# Patient Record
Sex: MALE
[De-identification: names, ages, dates, MRNs, and addresses within clinical notes are randomized per-mention and may not be internally consistent; named-entity substitution may affect disease eponyms.]

---

## 2024-05-31 PROBLEM — Z00.00 ENCOUNTER FOR PREVENTIVE HEALTH EXAMINATION: Status: ACTIVE | Noted: 2024-05-31

## 2024-06-03 ENCOUNTER — APPOINTMENT (OUTPATIENT)
Dept: ORTHOPEDIC SURGERY | Facility: CLINIC | Age: 34
End: 2024-06-03
Payer: COMMERCIAL

## 2024-06-03 DIAGNOSIS — M76.899 OTHER SPECIFIED ENTHESOPATHIES OF UNSPECIFIED LOWER LIMB, EXCLUDING FOOT: ICD-10-CM

## 2024-06-03 DIAGNOSIS — S86.019S: ICD-10-CM

## 2024-06-03 PROCEDURE — 99204 OFFICE O/P NEW MOD 45 MIN: CPT

## 2024-06-03 RX ORDER — DICLOFENAC SODIUM 20 MG/G
2 SOLUTION TOPICAL 3 TIMES DAILY
Qty: 1 | Refills: 0 | Status: ACTIVE | COMMUNITY
Start: 2024-06-03

## 2024-06-03 NOTE — PHYSICAL EXAM
[de-identified] : Gen: NAD Resp: Nonlabored respirations, no SOB Gait: Nl  R Knee: Skin intact No effusion 0-130 AROM Nontender MJL/LJL, superior/inferior pole patella 5/5 IP Q EHL TA GS SILT L3-S1 2+ dp pt wwp bcr TTP biceps femoris insertion with resisted knee flexion  1A lachman and (-) pivot shift Grade 1 posterior drawer Stable to v/v at 0 and 30 degrees (-) Dial test at 30, 90 degrees  (-) Mira, [place]ssaly Stable and pain-free 2 leg squat  1+ patellar translation (-) pain with patellar compression/grind (-) J sign (-) apprehension   Constitutional: Well-nourished, well-developed, No acute distress  Respiratory:  Good respiratory effort, no SOB  Psychiatric: Pleasant and normal affect, alert and oriented x3   Foot: Skin: Clean dry and intact  Ankle: nontender, no proximal fibular pain Tenderness: achilles insertion, full strength ROM: full ankle/knee rom, -10-30 DF-PF, nl eversion/inversion 5/5 EHL TA GS SILT L3-S1 2+ dp bcr

## 2024-06-03 NOTE — DISCUSSION/SUMMARY
[de-identified] : 33yM pw R biceps femoris tendinitis and L achilles tendinitis.  The patient was extensively counseled on treatment options including but not limited to observation, rest/activity modification, bracing, anti-inflammatory medications, physical therapy, injections, and surgery.  The natural history of the disease was thoroughly explained.  We discussed that the majority of the time, this condition can be initially treated conservatively. The patient will proceed with: -PT -diclofenac rx provided - pt instructed to take with meals and not with other nsaid's including advil, aleve, and toradol.  The pt understands to stop taking the medication if any stomach sx develop or they develop any type of bleeding or bruising, at which point they will present to their PMD -pt was instructed on the importance of resting, icing and elevating to minimize swelling -RTC 6w   I have personally obtained the history, reviewed the ROS as noted, and performed the physical examination today.  The patient and I discussed the assessment and options and developed the plan.  All questions were answered and the patient stated their understanding of the treatment plan and appreciation of the visit.   My cumulative time spent on this patient's visit included: Preparation for the visit, review of the medical records, review of pertinent diagnostic studies, examination and counseling of the patient on the above diagnosis, treatment plan and prognosis, orders of diagnostic tests, medications and/or appropriate procedures and documentation in the medical records of today's visit.   Fracisco Huang MD

## 2024-06-03 NOTE — HISTORY OF PRESENT ILLNESS
[de-identified] : Lars is a 34yo male presenting today with R knee and Left Achilles pain. Denies any recent trauma, previous hx of injury. Onset about a month ago. He works out every week, noticed the discomfort and stopped working out. Lateral knee pain described as dull and tight, worse with prolonged standing, walking and bending. Left achilles pain described as sore and tight especially first thing in the morning with first steps then symptoms gradually improve. Previously evaluated by another MD but sough second opinion. Has not been medically treated but is scheduled to begin PT today. Does not use or take anything for the pain.

## 2025-07-20 ENCOUNTER — EMERGENCY (EMERGENCY)
Facility: HOSPITAL | Age: 35
LOS: 1 days | End: 2025-07-20
Attending: STUDENT IN AN ORGANIZED HEALTH CARE EDUCATION/TRAINING PROGRAM | Admitting: STUDENT IN AN ORGANIZED HEALTH CARE EDUCATION/TRAINING PROGRAM
Payer: COMMERCIAL

## 2025-07-20 VITALS
HEART RATE: 69 BPM | WEIGHT: 199.96 LBS | HEIGHT: 73 IN | TEMPERATURE: 98 F | DIASTOLIC BLOOD PRESSURE: 90 MMHG | SYSTOLIC BLOOD PRESSURE: 150 MMHG | OXYGEN SATURATION: 98 % | RESPIRATION RATE: 16 BRPM

## 2025-07-20 PROCEDURE — 73120 X-RAY EXAM OF HAND: CPT | Mod: 26,RT

## 2025-07-20 PROCEDURE — 99284 EMERGENCY DEPT VISIT MOD MDM: CPT | Mod: 25

## 2025-07-20 RX ORDER — ACETAMINOPHEN 500 MG/5ML
975 LIQUID (ML) ORAL ONCE
Refills: 0 | Status: COMPLETED | OUTPATIENT
Start: 2025-07-20 | End: 2025-07-20

## 2025-07-20 RX ORDER — AMOXICILLIN AND CLAVULANATE POTASSIUM 500; 125 MG/1; MG/1
1 TABLET, FILM COATED ORAL ONCE
Refills: 0 | Status: COMPLETED | OUTPATIENT
Start: 2025-07-20 | End: 2025-07-20

## 2025-07-20 RX ORDER — AMOXICILLIN AND CLAVULANATE POTASSIUM 500; 125 MG/1; MG/1
1 TABLET, FILM COATED ORAL
Qty: 14 | Refills: 0
Start: 2025-07-20 | End: 2025-07-26

## 2025-07-20 RX ORDER — IBUPROFEN 200 MG
600 TABLET ORAL ONCE
Refills: 0 | Status: COMPLETED | OUTPATIENT
Start: 2025-07-20 | End: 2025-07-20

## 2025-07-20 RX ADMIN — Medication 600 MILLIGRAM(S): at 01:55

## 2025-07-20 RX ADMIN — Medication 975 MILLIGRAM(S): at 01:55

## 2025-07-20 RX ADMIN — AMOXICILLIN AND CLAVULANATE POTASSIUM 1 TABLET(S): 500; 125 TABLET, FILM COATED ORAL at 01:55

## 2025-07-20 NOTE — ED PROVIDER NOTE - CLINICAL SUMMARY MEDICAL DECISION MAKING FREE TEXT BOX
34y healthy male presenting with dog bite on the R thumb    PDx: dog bite    Plan  X-ray of thumb  Augmentin 500mg po  Tetanus booster  Discharge if x-ray negative and follow up w/ PCP

## 2025-07-20 NOTE — ED ADULT TRIAGE NOTE - CHIEF COMPLAINT QUOTE
Pt c/o dog bite today to right thumb. Able to move extremity, no active bleeding. Denies medical conditions, blood thinner use.

## 2025-07-20 NOTE — ED PROVIDER NOTE - PHYSICAL EXAMINATION
CONSTITUTIONAL: Well appearing, awake, alert, and in no apparent distress.  HEAD: normocephalic, atraumatic  ENT: oral mucosa moist  CARDIAC: regular rate and rhythm; no murmurs, rubs, or gallops  RESPIRATORY: normal breath sounds, clear to ascultation bilaterally, no rales, rhonchi, wheezing  GASTROINTESTINAL: abdomen nondistended, soft, nontender, no guarding, rebound tenderness  MSK: Spine appears normal, range of motion is not limited, no muscle or joint tenderness  Some small punctate wounds on the lateral aspects of the right thumb. No gross swelling, erythema, or ecchymosis. Tenderness with slight palpation of the right thumb. 5/5 strength with wrist flexion and extension bilaterally. Was not able to perform thumb extension on the right due to pain.  NEURO: Alert and oriented, no focal deficits, no motor or sensory deficits.  SKIN: Skin normal color for race, warm, dry and intact. No evidence of rash.  PSYCH: appropriate mood and affect CONSTITUTIONAL: Well appearing, awake, alert, and in no apparent distress.  HEAD: normocephalic, atraumatic  ENT: oral mucosa moist  CARDIAC: regular rate and rhythm; no murmurs, rubs, or gallops  RESPIRATORY: normal breath sounds, clear to ascultation bilaterally, no rales, rhonchi, wheezing  GASTROINTESTINAL: abdomen nondistended, soft, nontender, no guarding, rebound tenderness  MSK: Spine appears normal, range of motion is not limited, no muscle or joint tenderness  Some small punctate wounds on the lateral aspects of the right thumb. No gross swelling, erythema, or ecchymosis. Tenderness with slight palpation of the right thumb. 5/5 strength with wrist flexion and extension bilaterally. Was not able to perform thumb extension on the right due to pain.  NEURO: Alert and oriented, no focal deficits, no motor or sensory deficits.  SKIN: Skin normal color for race, warm, dry and intact. No evidence of rash. Bite findings as above.  PSYCH: appropriate mood and affect

## 2025-07-20 NOTE — ED PROVIDER NOTE - ATTENDING CONTRIBUTION TO CARE
34M no PMH p/w animal bite. Pt was at friend's house when dog "latched on" to his finger approx 7 hours prior to ED assessment. Uncertain of vaccination status of dog. Notes pain and swelling to finger since bite. Cleaned with water, alcohol, and peroxide at home. . Originally was not going to come to ED but was passing by so came in for assessment. On exam has small punctate wounds on the medial and lateral aspects of the right thumb with mild swelling and tenderness. 5/5 strength with wrist flexion and extension bilaterally. Thumb with limited ROM due to pain.   MDM: 34M no PMH p/w animal bite with exam notable for several small punctate wound to lateral and medial aspects of thumb. No other areas affected. Last tetanus 10 or more years ago so will update today. Will give augmentin for ppx. Will get XRay to r/o fracture. Recommended rabies vaccination series given unknown status of the dog but after explaining to patient he had declined the series with informed consent. Return instructions provided. DC team to assist with arranging hand follow-up.

## 2025-07-20 NOTE — ED PROVIDER NOTE - PATIENT PORTAL LINK FT
You can access the FollowMyHealth Patient Portal offered by [facility] by registering at the following website: [email or web address]. By joining [facility] FollowMyHealth portal, you will also be able to view your health information using other applications (apps) compatible with our system.

## 2025-07-20 NOTE — ED PROVIDER NOTE - OBJECTIVE STATEMENT
This is a 34y healthy M self-presenting for a dog bite on right thumb. He says this his third animal bite but 1st on the UE. Around 7 hours ago he was bit by a domesticated "pocket pit bill". The dog did not show signs of rabies, however its vaccination status could not be verified. After he was bit he put his thumb in a cup of alcohol and peroxide for about 30 minutes and decided to come to the ED several hours later. The pain started a few hours ago and has increased in intensity since its onset. The pain is 4/10 without movement and 7/10 with movement. Moving it around makes it feel worse. He did not take any pain medications. He denies fever, chills, shortness of breath, numbness/tingling, and abdominal pain.

## 2025-07-20 NOTE — ED PROVIDER NOTE - PROGRESS NOTE DETAILS
Pt uncertain of dog's vaccination status. Discussed rabies vaccination series with patient but he has declined administration. Explained risks of potential exposure given unknown vaccination status of dog and risk of death if contracted rabies but pt still declined with full understanding of risks.

## 2025-07-20 NOTE — ED ADULT NURSE NOTE - OBJECTIVE STATEMENT
Pt is A&Ox4 and ambulatory at baseline. Pt was bit by a dog to the right thumb earlier today. Pt states the owners of the dog just got it and is unsure of the vaccination history. Puncture wound to right thumb. Not actively bleeding. Pt medicated as ordered. Denies fever, chills, CP, SOB, N/V/D, blood thinner use. Stretcher is in the lowest position and safety maintained.

## 2025-07-20 NOTE — ED PROVIDER NOTE - NSFOLLOWUPINSTRUCTIONS_ED_ALL_ED_FT
You were seen in the ED today for a dog bite. You received a tetanus vaccine but opted out of rabies vaccination series. Our discharge team will assist with outpatient follow-up with a Hand specialist to monitor your progress      Please complete full course of of AUGMENTIN as prescribed. You received the first dose in the ED and the remainder of the course was sent to your pharmacy.    For pain, take Tylenol 650-975mg and/or Motrin 400-600mg every 6 hours as needed. You can also apply ice to the thumb to help limit swelling.    Return to ED if you experience worsening swelling, pus drainage from wounds, inability to move finger or swelling extending throughout the hand, or anything else concerning to you.